# Patient Record
Sex: FEMALE | Race: WHITE | URBAN - METROPOLITAN AREA
[De-identification: names, ages, dates, MRNs, and addresses within clinical notes are randomized per-mention and may not be internally consistent; named-entity substitution may affect disease eponyms.]

---

## 2018-12-17 ENCOUNTER — OFFICE VISIT (OUTPATIENT)
Dept: FAMILY MEDICINE CLINIC | Age: 73
End: 2018-12-17

## 2018-12-17 VITALS
DIASTOLIC BLOOD PRESSURE: 88 MMHG | WEIGHT: 131 LBS | TEMPERATURE: 98.6 F | SYSTOLIC BLOOD PRESSURE: 146 MMHG | BODY MASS INDEX: 25.72 KG/M2 | HEIGHT: 60 IN | HEART RATE: 78 BPM

## 2018-12-17 DIAGNOSIS — G40.409 OTHER GENERALIZED EPILEPSY, NOT INTRACTABLE, WITHOUT STATUS EPILEPTICUS (HCC): Primary | ICD-10-CM

## 2018-12-17 DIAGNOSIS — I10 ESSENTIAL HYPERTENSION: ICD-10-CM

## 2018-12-17 RX ORDER — CARBAMAZEPINE 200 MG/1
200 TABLET ORAL 2 TIMES DAILY
Qty: 180 TAB | Refills: 3 | Status: SHIPPED | OUTPATIENT
Start: 2018-12-17 | End: 2021-03-19 | Stop reason: SDUPTHER

## 2018-12-17 RX ORDER — PHENOBARBITAL 100 MG/1
100 TABLET ORAL
Qty: 30 TAB | Refills: 5 | Status: SHIPPED | OUTPATIENT
Start: 2018-12-17 | End: 2019-06-25 | Stop reason: SDUPTHER

## 2018-12-17 RX ORDER — AMITRIPTYLINE HYDROCHLORIDE 25 MG/1
25 TABLET, FILM COATED ORAL
Qty: 90 TAB | Refills: 3 | Status: SHIPPED | OUTPATIENT
Start: 2018-12-17 | End: 2020-01-13

## 2018-12-17 RX ORDER — ATENOLOL 100 MG/1
100 TABLET ORAL DAILY
Qty: 90 TAB | Refills: 3 | Status: SHIPPED | OUTPATIENT
Start: 2018-12-17 | End: 2021-03-19 | Stop reason: SDUPTHER

## 2018-12-17 NOTE — PROGRESS NOTES
HISTORY OF PRESENT ILLNESS  Letty Pandey is a 74 y.o. female. HPI  Patient states she is a breast cancer survivor. Had 2 big surgeries in [place]. She is taking carbamazepine, amitryptiline. Fenobarbital.  Patient has epilepsy and takes antiseizure medication. Seizure free for 17 years. Needs refills; Atenolol 100 mg  Carbamazepine 200 mg twice a day  Phenobarbital 100 mg once a day at bedtime  Amitryptiline 25 mg once a day at bedtime  Has not been taking for the past 2 weeks. Review of Systems   Constitutional: Negative for chills, fever and weight loss. HENT: Negative for hearing loss. Respiratory: Negative for cough, sputum production, shortness of breath and wheezing. Cardiovascular: Negative for chest pain, palpitations and leg swelling. Gastrointestinal: Negative for abdominal pain, constipation, diarrhea, heartburn, nausea and vomiting. Musculoskeletal: Negative for back pain, myalgias and neck pain. Neurological: Negative for dizziness and headaches. /88 (BP 1 Location: Left arm)   Pulse 78   Temp 98.6 °F (37 °C) (Oral)   Ht 5' 0.04\" (1.525 m)   Wt 131 lb (59.4 kg)   BMI 25.55 kg/m²   Physical Exam   Constitutional: She is oriented to person, place, and time. She appears well-developed. HENT:   Head: Normocephalic. Right Ear: External ear normal.   Left Ear: External ear normal.   Eyes: Conjunctivae and EOM are normal. Pupils are equal, round, and reactive to light. Neck: Normal range of motion. Neck supple. No thyromegaly present. Cardiovascular: Normal rate, regular rhythm and normal heart sounds. No murmur heard. Pulmonary/Chest: Effort normal and breath sounds normal. No respiratory distress. She has no wheezes. She has no rales. Abdominal: Soft. Bowel sounds are normal. She exhibits no distension. Musculoskeletal: Normal range of motion. She exhibits no edema. Neurological: She is alert and oriented to person, place, and time.  She has normal reflexes. Skin: Skin is warm. ASSESSMENT and PLAN  Diagnoses and all orders for this visit:    1. Other generalized epilepsy, not intractable, without status epilepticus ([facility]  -     REFERRAL TO NEUROLOGY  -     carBAMazepine (TEGRETOL) 200 mg tablet; Take 1 Tab by mouth two (2) times a day. -     PHENobarbital (LUMINAL) 100 mg tablet; Take 1 Tab by mouth nightly. Max Daily Amount: 100 mg.  -     amitriptyline (ELAVIL) 25 mg tablet; Take 1 Tab by mouth nightly. 2. Essential hypertension  -     atenolol (TENORMIN) 100 mg tablet; Take 1 Tab by mouth daily. I have refill patient's medications today,  We will have her return in a month for follow up and levels.   We need to refer patient to neurology

## 2018-12-17 NOTE — PROGRESS NOTES
Printed AVS, provided to pt and reviewed. Pt indicated understanding and had no questions. Told pt that rx's have been sent to pharmacy and they should be ready for  in approximately 2 hrs. Pt told to please present Good[employer]. com coupon which we provide to your pharmacy to receive discounted price. Reviewed medications with the pt and Dtr. The appt for Neurology was made for the pt and the Care Card application was given to the pt. Told pt to ask about the care card which is our financial assistance program.  Also told pt that they will be required to do a financial screening  Also told them that if they get a bill before they get their card to call the phone # on the bill to let them know they have applied for the Care Card. Gave pt financial assistance application and highlighted for them the [employer] assistance phone number for them as well.  Nely Dillon RN

## 2019-06-21 ENCOUNTER — TELEPHONE (OUTPATIENT)
Dept: FAMILY MEDICINE CLINIC | Age: 74
End: 2019-06-21

## 2019-06-21 NOTE — TELEPHONE ENCOUNTER
Pt left message regarding her medications. She stated that she had to leave the [place] emergency and could not  refills for her medications while she was away. She is back now and tried to get her medications but was advised that they were . RN called [facility], which advised that the only prescription that has  is Phenobarbital which is good for 6 months. Pt was last seen on 1719 and was to have returned in one month for follow up and levels checked. Routing to Dr. Anali Luna for review.   Wen Amador RN

## 2019-06-24 NOTE — TELEPHONE ENCOUNTER
Pt per provider's message the pt needs an appt. Message routed to front office to call the pt schedule appt.  Polina Shirley RN

## 2019-06-25 DIAGNOSIS — G40.409 OTHER GENERALIZED EPILEPSY, NOT INTRACTABLE, WITHOUT STATUS EPILEPTICUS (HCC): ICD-10-CM

## 2019-06-25 RX ORDER — PHENOBARBITAL 100 MG/1
100 TABLET ORAL
Qty: 30 TAB | Refills: 0 | OUTPATIENT
Start: 2019-06-25 | End: 2021-03-19 | Stop reason: SDUPTHER

## 2019-06-25 NOTE — TELEPHONE ENCOUNTER
Daughter called. Needs Phenobard refilled for mothe/pt. She reports she saw a Neurologist in [facility] but does not have records of visit , cannot get records of visit and he did not give meds refills. Asking for refills as mother is 68 w/ a seizure do. Pt is on 4 meds and all but Phenobarb was order on 12/17/18 for a year except phenobarb was 6 months. Called back to family and scheduled f/u appt in about 1 month on 7/15/19 at 3p at [address] [place] [address]. Daughter does not want to schedule Neurology appt. \" Mother has been on Phenobarb for 40 yrs and she does not have insurance and I am not working\". Discussed financial assistance program to assist w/ specialist bills.     Routing to Dr Miguel Gifford as Dr Crystal Wang is out of office

## 2020-01-11 DIAGNOSIS — G40.409 OTHER GENERALIZED EPILEPSY, NOT INTRACTABLE, WITHOUT STATUS EPILEPTICUS (HCC): ICD-10-CM

## 2020-01-13 RX ORDER — AMITRIPTYLINE HYDROCHLORIDE 25 MG/1
TABLET, FILM COATED ORAL
Qty: 90 TAB | Refills: 0 | Status: SHIPPED | OUTPATIENT
Start: 2020-01-13 | End: 2021-02-24

## 2020-01-13 NOTE — TELEPHONE ENCOUNTER
Patient's daughter Katalina Rivera left a message on 1/11/2020 requesting a refill for Amitrityline for patient. Chart reviewed and refill request has been approved by provider for patient. Nurse telephone patient's daughter Katalina Rivera on PHI, no answer left message that we were returning her phone call. Patient's daughter Katalina Rivera returned phone call. Discussed that  Amitrityline Prescription has been approved by provider and rx sent to patient's pharmacy on file. Per daughter patient has enough medication of the other prescriptions and only needed  Amitrityline at this time. Discussed that per policy for future refills the patient will have to be seen. New process for same day appointment discussed and phone number to call [phone] (between 6-7:30am) was given. This has been fully explained to the patient's daughter , who indicates understanding and agrees with plan. No further questions at this time.     Kian Purcell RN

## 2021-02-24 DIAGNOSIS — G40.409 OTHER GENERALIZED EPILEPSY, NOT INTRACTABLE, WITHOUT STATUS EPILEPTICUS (HCC): ICD-10-CM

## 2021-02-24 RX ORDER — AMITRIPTYLINE HYDROCHLORIDE 25 MG/1
TABLET, FILM COATED ORAL
Qty: 90 TAB | Refills: 0 | Status: SHIPPED | OUTPATIENT
Start: 2021-02-24 | End: 2021-03-19 | Stop reason: SDUPTHER

## 2021-03-19 DIAGNOSIS — I10 ESSENTIAL HYPERTENSION: ICD-10-CM

## 2021-03-19 DIAGNOSIS — G40.409 OTHER GENERALIZED EPILEPSY, NOT INTRACTABLE, WITHOUT STATUS EPILEPTICUS (HCC): ICD-10-CM

## 2021-03-19 NOTE — TELEPHONE ENCOUNTER
Tc from the pt's Dtr she is stating the pt is in need of her refills. They went to the Pharmacy and they said she had no more refills. The pt only will have Amitriptyline 25 mg. She will be completely out of the Trifluoperazine 5 mg, on Monday. This medication was not noted on the pt's current medication list. She will also be running out of Atenolol 100 mg next week. The pt has Phenobarbital 100 mg and carbamazepine 200 mg listed on her current medication list. The pt's last [facility] appt was 02/17/2018. 2 yrs ago. The pt's Dtr was advised to call the same day appt line on Monday at 7am and get an appt for the pt. The pt's Dtr was told the provider would be sent a message for the refill request but the it is really recommended that the pt bee seen by the provider. The  Pt would be  due for lab work as well. The pt's Dtr verbalized understanding. The Dtr stated they would call on Monday for an appt. The Dtr stated that is a problem trying to get to the appt. The pt's dtr was told she could probably have a VV Dr appt if F2F would be a problem for her. This message was sent to the provider.  Sterling Ashraf RN

## 2021-03-22 RX ORDER — ATENOLOL 100 MG/1
100 TABLET ORAL DAILY
Qty: 90 TAB | Refills: 3 | Status: SHIPPED | OUTPATIENT
Start: 2021-03-22 | End: 2021-03-23 | Stop reason: SINTOL

## 2021-03-22 RX ORDER — AMITRIPTYLINE HYDROCHLORIDE 25 MG/1
TABLET, FILM COATED ORAL
Qty: 90 TAB | Refills: 0 | Status: SHIPPED | OUTPATIENT
Start: 2021-03-22 | End: 2021-03-23 | Stop reason: ALTCHOICE

## 2021-03-22 RX ORDER — PHENOBARBITAL 100 MG/1
100 TABLET ORAL
Qty: 30 TAB | Refills: 0 | Status: SHIPPED | OUTPATIENT
Start: 2021-03-22 | End: 2021-03-23

## 2021-03-22 RX ORDER — CARBAMAZEPINE 200 MG/1
200 TABLET ORAL 2 TIMES DAILY
Qty: 180 TAB | Refills: 3 | Status: SHIPPED | OUTPATIENT
Start: 2021-03-22 | End: 2021-03-23

## 2021-03-23 ENCOUNTER — VIRTUAL VISIT (OUTPATIENT)
Dept: FAMILY MEDICINE CLINIC | Age: 76
End: 2021-03-23

## 2021-03-23 DIAGNOSIS — F33.42 RECURRENT MAJOR DEPRESSIVE DISORDER, IN FULL REMISSION (HCC): ICD-10-CM

## 2021-03-23 DIAGNOSIS — G40.309 NONINTRACTABLE GENERALIZED IDIOPATHIC EPILEPSY WITHOUT STATUS EPILEPTICUS (HCC): Primary | ICD-10-CM

## 2021-03-23 DIAGNOSIS — I10 ESSENTIAL HYPERTENSION: ICD-10-CM

## 2021-03-23 PROCEDURE — 99443 PR PHYS/QHP TELEPHONE EVALUATION 21-30 MIN: CPT | Performed by: FAMILY MEDICINE

## 2021-03-23 RX ORDER — PHENOBARBITAL 100 MG/1
100 TABLET ORAL
Qty: 90 TAB | Refills: 0 | Status: SHIPPED | OUTPATIENT
Start: 2021-03-23 | End: 2021-11-11

## 2021-03-23 RX ORDER — ENALAPRIL MALEATE 20 MG/1
20 TABLET ORAL DAILY
Qty: 90 TAB | Refills: 1 | Status: SHIPPED | OUTPATIENT
Start: 2021-03-23 | End: 2021-11-11

## 2021-03-23 RX ORDER — AMITRIPTYLINE HYDROCHLORIDE 25 MG/1
25 TABLET, FILM COATED ORAL
Qty: 30 TAB | Refills: 1 | Status: SHIPPED | OUTPATIENT
Start: 2021-03-23 | End: 2021-11-11

## 2021-03-23 RX ORDER — TRIFLUOPERAZINE HYDROCHLORIDE 5 MG/1
5 TABLET, FILM COATED ORAL DAILY
Qty: 30 TAB | Refills: 1 | Status: SHIPPED | OUTPATIENT
Start: 2021-03-23 | End: 2021-05-17

## 2021-03-23 RX ORDER — CARBAMAZEPINE 200 MG/1
200 TABLET ORAL
Qty: 90 TAB | Refills: 1 | Status: SHIPPED | OUTPATIENT
Start: 2021-03-23 | End: 2022-06-13

## 2021-03-23 NOTE — PROGRESS NOTES
Coordination of Care  1. Have you been to the ER, urgent care clinic since your last visit? Hospitalized since your last visit? No    2. Have you seen or consulted any other health care providers outside of the [address] [place] [address] since your last visit? Include any pap smears or colon screening. No    Does the patient need refills? YES    Learning Assessment Complete? yes  Depression Screening complete in the past 12 months? yes    Per patient no access to vital sign equipment at home.

## 2021-03-23 NOTE — PROGRESS NOTES
Letty Dave (: 1945) is a 66 y.o. female, established patient, here for evaluation of the following chief complaint(s):   Epilepsy (f/u), Hypertension (f/u), and Other (check depression screening)       ASSESSMENT/PLAN:  1. Nonintractable generalized idiopathic epilepsy without status epilepticus ([facility]  Controlled on current regimen. Discussed needs for labs and check up. Patient and daughter agree. -     carBAMazepine (TEGretol) 200 mg tablet; Take 1 Tab by mouth nightly., Normal, Disp-90 Tab, R-1  -     PHENobarbitaL (LUMINAL) 100 mg tablet; Take 1 Tab by mouth nightly. Max Daily Amount: 100 mg., Normal, Disp-90 Tab, R-0I phoned in, has appt with Dr. Rosemarie Greer 7/15  -     CARBAMAZEPINE; Future  -     CBC WITH AUTOMATED DIFF; Future  -     METABOLIC PANEL, COMPREHENSIVE; Future  -     TSH 3RD GENERATION; Future  -     PHENOBARBITAL; Future  2. Essential hypertension  Continue with Enalapril and follow up for F2F visit for BP check  3. Recurrent major depressive disorder, in full remission ([facility]  With increased depressive sx off medication. Discussed the risks with her current regimen including increased TD risk, seizure risk, dementia, falls, neutropenia. I recommended changing regimen to SSRI (Zoloft or Celexa) due to less long term side effects but both daughter and patient were very resistant due to patient being controlled on current regimen for 20 years. They did agree to eliminate one TCA and will use Elavil alone instead of Elavil plus Imipramine. The history of prolonged insomnia in past without the atypical treatment could suggest a Bipolar depression. Return in about 4 weeks (around 2021) for follow up for F2F in 4-6 weeks for check up. .    SUBJECTIVE/OBJECTIVE:  HPI Spoke with patient and daughter who manages patient's care. Since last F2F visit patient was in [place] for a time. [facility].   Seizure Disorder:  Patient is taking Tegretol 200mg QHS, Phenobarbital 100mg QHS regularly without any side effects. Started treatment about 20 years ago. Sees Neurologist regularly in [facility], lasted checked over a year ago. Last seizure was 4 years ago and it was a generalized seizure with LOC. HTN:  Patient is taking Enalapril regularly without any side effects. Stopped Atenolol due to side effects. Does not have BP home monitor. Depression/Insomnia:  Patient with a long history of severe depression with insomnia. Currently taking Trifluoperazine 5mg QAM (for 20 years per patient), Elavil 25 mg, and Imipramine 25mg QHS. She has been cutting back on her medication because she is running out and is having depressed mood, crying spells, insomnia. Prior trials in [facility] were Diazepam and others that she does not remember. She states in past she was not able to sleep for days if she did not take her current combination. Prior appointments she had not mentioned Trifluoperazine because she was getting the medication from [facility]. Review of Systems   Constitutional: Negative for diaphoresis, fatigue and fever. Respiratory: Negative for cough, chest tightness and shortness of breath. Cardiovascular: Negative for chest pain and palpitations. Neurological: Negative for tremors, seizures, syncope and light-headedness. Patient-Reported Vitals 3/23/2021   Patient-Reported LMP menapause       Physical Exam    On this date 03/23/2021 I have spent 30 minutes reviewing previous notes, test results and face to face (virtual) with the patient discussing the diagnosis and importance of compliance with the treatment plan as well as documenting on the day of the visit. Maria Isabel Echeverria is being evaluated by a Virtual Visit (phone visit) encounter to address concerns as mentioned above. A caregiver was present when appropriate.  Due to this being a TeleHealth encounter (During FGABJ-78 public health emergency), evaluation of the following organ systems was limited: Vitals/Constitutional/EENT/Resp/CV/GI//MS/Neuro/Skin/Heme-Lymph-Imm. Pursuant to the emergency declaration under the [address] [place] [address] and the [employer] Act, this Virtual Visit was conducted with patient's (and/or legal guardian's) consent, to reduce the patient's risk of exposure to COVID-19 and provide necessary medical care. The patient (and/or legal guardian) has also been advised to contact this office for worsening conditions or problems, and seek emergency medical treatment and/or call 911 if deemed necessary. Patient identification was verified at the start of the visit: YES    Services were provided through a phone synchronous discussion virtually to substitute for in-person clinic visit. Patient was located at home and provider was located in office or at home. An electronic signature was used to authenticate this note.   -- Carmen Peacock MD

## 2021-03-23 NOTE — PROGRESS NOTES
Avs discussed with Letty Camejo by Discharge Nurse Vianney Kaye LPN. Discussed medication prescribed today, pt/ daughter states no coupon needed. Pt is aware that she will be put on the COVID vaccine list for an appt. Patient verbalized understanding and has no further questions.  AVS printed and given to patient Vianney Kaye LPN

## 2021-04-16 ENCOUNTER — IMMUNIZATION (OUTPATIENT)
Dept: FAMILY MEDICINE CLINIC | Age: 76
End: 2021-04-16

## 2021-04-16 DIAGNOSIS — Z23 ENCOUNTER FOR IMMUNIZATION: Primary | ICD-10-CM

## 2021-04-16 PROCEDURE — 0011A COVID-19, MRNA, LNP-S, PF, 100MCG/0.5ML DOSE(MODERNA): CPT

## 2021-04-16 PROCEDURE — 91301 COVID-19, MRNA, LNP-S, PF, 100MCG/0.5ML DOSE(MODERNA): CPT

## 2021-04-20 ENCOUNTER — HOSPITAL ENCOUNTER (OUTPATIENT)
Dept: LAB | Age: 76
Discharge: HOME | End: 2021-04-20

## 2021-04-20 ENCOUNTER — LAB ONLY (OUTPATIENT)
Dept: FAMILY MEDICINE CLINIC | Age: 76
End: 2021-04-20

## 2021-04-20 DIAGNOSIS — G40.309 NONINTRACTABLE GENERALIZED IDIOPATHIC EPILEPSY WITHOUT STATUS EPILEPTICUS (HCC): ICD-10-CM

## 2021-04-20 PROCEDURE — 80184 ASSAY OF PHENOBARBITAL: CPT

## 2021-04-20 PROCEDURE — 84443 ASSAY THYROID STIM HORMONE: CPT

## 2021-04-20 PROCEDURE — 85025 COMPLETE CBC W/AUTO DIFF WBC: CPT

## 2021-04-20 PROCEDURE — 80053 COMPREHEN METABOLIC PANEL: CPT

## 2021-04-21 LAB
ALBUMIN SERPL-MCNC: 4.1 G/DL (ref 3.5–5)
ALBUMIN/GLOB SERPL: 1.1 {RATIO} (ref 1.1–2.2)
ALP SERPL-CCNC: 187 U/L (ref 45–117)
ALT SERPL-CCNC: 30 U/L (ref 12–78)
ANION GAP SERPL CALC-SCNC: 8 MMOL/L (ref 5–15)
AST SERPL-CCNC: 23 U/L (ref 15–37)
BASOPHILS # BLD: 0 K/UL (ref 0–0.1)
BASOPHILS NFR BLD: 1 % (ref 0–1)
BILIRUB SERPL-MCNC: 0.2 MG/DL (ref 0.2–1)
BUN SERPL-MCNC: 18 MG/DL (ref 6–20)
BUN/CREAT SERPL: 21 (ref 12–20)
CALCIUM SERPL-MCNC: 9.2 MG/DL (ref 8.5–10.1)
CHLORIDE SERPL-SCNC: 101 MMOL/L (ref 97–108)
CO2 SERPL-SCNC: 28 MMOL/L (ref 21–32)
COMMENT, HOLDF: NORMAL
CREAT SERPL-MCNC: 0.84 MG/DL (ref 0.55–1.02)
DIFFERENTIAL METHOD BLD: NORMAL
EOSINOPHIL # BLD: 0.1 K/UL (ref 0–0.4)
EOSINOPHIL NFR BLD: 1 % (ref 0–7)
ERYTHROCYTE [DISTWIDTH] IN BLOOD BY AUTOMATED COUNT: 12.3 % (ref 11.5–14.5)
GLOBULIN SER CALC-MCNC: 3.7 G/DL (ref 2–4)
GLUCOSE SERPL-MCNC: 96 MG/DL (ref 65–100)
HCT VFR BLD AUTO: 45.1 % (ref 35–47)
HGB BLD-MCNC: 14.8 G/DL (ref 11.5–16)
IMM GRANULOCYTES # BLD AUTO: 0 K/UL (ref 0–0.04)
IMM GRANULOCYTES NFR BLD AUTO: 0 % (ref 0–0.5)
LYMPHOCYTES # BLD: 1.8 K/UL (ref 0.8–3.5)
LYMPHOCYTES NFR BLD: 37 % (ref 12–49)
MCH RBC QN AUTO: 31.3 PG (ref 26–34)
MCHC RBC AUTO-ENTMCNC: 32.8 G/DL (ref 30–36.5)
MCV RBC AUTO: 95.3 FL (ref 80–99)
MONOCYTES # BLD: 0.4 K/UL (ref 0–1)
MONOCYTES NFR BLD: 8 % (ref 5–13)
NEUTS SEG # BLD: 2.6 K/UL (ref 1.8–8)
NEUTS SEG NFR BLD: 53 % (ref 32–75)
NRBC # BLD: 0 K/UL (ref 0–0.01)
NRBC BLD-RTO: 0 PER 100 WBC
PHENOBARB SERPL-MCNC: 26 UG/ML (ref 15–40)
PLATELET # BLD AUTO: 333 K/UL (ref 150–400)
PMV BLD AUTO: 10.9 FL (ref 8.9–12.9)
POTASSIUM SERPL-SCNC: 4.4 MMOL/L (ref 3.5–5.1)
PROT SERPL-MCNC: 7.8 G/DL (ref 6.4–8.2)
RBC # BLD AUTO: 4.73 M/UL (ref 3.8–5.2)
SAMPLES BEING HELD,HOLD: NORMAL
SODIUM SERPL-SCNC: 137 MMOL/L (ref 136–145)
TSH SERPL DL<=0.05 MIU/L-ACNC: 1.18 UIU/ML (ref 0.36–3.74)
WBC # BLD AUTO: 4.9 K/UL (ref 3.6–11)

## 2021-04-22 NOTE — PROGRESS NOTES
Alk Phos is a little high. This can be found in liver or bone. It can be rechecked in 6 months. Other labs look good.

## 2021-04-29 ENCOUNTER — OFFICE VISIT (OUTPATIENT)
Dept: FAMILY MEDICINE CLINIC | Age: 76
End: 2021-04-29

## 2021-04-29 VITALS
DIASTOLIC BLOOD PRESSURE: 95 MMHG | WEIGHT: 132.2 LBS | BODY MASS INDEX: 26.65 KG/M2 | HEIGHT: 59 IN | TEMPERATURE: 98 F | HEART RATE: 106 BPM | SYSTOLIC BLOOD PRESSURE: 147 MMHG

## 2021-04-29 DIAGNOSIS — G40.309 NONINTRACTABLE GENERALIZED IDIOPATHIC EPILEPSY WITHOUT STATUS EPILEPTICUS (HCC): ICD-10-CM

## 2021-04-29 DIAGNOSIS — F41.1 GENERALIZED ANXIETY DISORDER: ICD-10-CM

## 2021-04-29 DIAGNOSIS — I10 ESSENTIAL HYPERTENSION: Primary | ICD-10-CM

## 2021-04-29 PROCEDURE — 99214 OFFICE O/P EST MOD 30 MIN: CPT | Performed by: FAMILY MEDICINE

## 2021-04-29 NOTE — PROGRESS NOTES
Raquel Adalberto Goodpasture (: 1945) is a 66 y.o. female, established patient, here for evaluation of the following chief complaint(s):  Follow-up       ASSESSMENT/PLAN:  1. Essential hypertension  Mildly elevated today in an anxious person. Will have daughter check BP at home and instructions given. 2. Generalized anxiety disorder  Controlled with current regimen without any SE noted. Continue with current care  3. Nonintractable generalized idiopathic epilepsy without status epilepticus ([facility]  Controlled, continue with current medications. Follow-up and Dispositions    · Return in about 6 months (around 10/29/2021) for follow up for F2F in 6 months for check up and labs. SUBJECTIVE:  HPI  Patient seen with daughter Wei Montes who manages patient's care. HTN:  Patient is taking Enalapril regularly without any side effects. Stopped Atenolol because it caused palpitations. Does not have BP home monitor. Seizure Disorder:  Started after a fall and brain bleed at 39 yo. Patient is taking Tegretol 200mg QHS, Phenobarbital 100mg QHS regularly without any side effects. Sees Neurologist regularly in [facility], lasted checked over a year ago. On treatment for 20 years. No recent seizures. Anxiety/Insomnia:  Patient with a long history of severe anxiety with insomnia. This started after fall and brain bleed at age 42yo. Currently taking Trifluoperazine 5mg QAM and Elavil 25 mg without any tremors, anxiety, memory difficulty, falls. Cutting back on her medication recently caused worsening depressed mood, crying spells, insomnia. Patient states she does not have a history of depression but gets depressed at times because she has not been allowed to return to [facility] due to [employer] restrictions. Her depressive sx may last 2-3 days and then resolve. Prior trials in [facility] for severe anxiety were Diazepam and others that she does not remember. Was followed by psychiatry.   Has taken current combination for 20 years.  Prior appointments she had not mentioned Trifluoperazine because she was getting the medication from [facility]. PHx:  Fall with brain bleed at age 44yo. Seizures and anxiety started after that injury. Ovarian Cancer:  Surgically removed 2017. Followed regularly by Oncology in [facility]. Review of Systems   Constitutional: Negative for diaphoresis, fatigue and fever. Respiratory: Negative for cough, chest tightness and shortness of breath. Cardiovascular: Negative for chest pain and palpitations. Neurological: Negative for tremors, seizures, syncope and light-headedness. OBJECTIVE:  Blood pressure (!) 147/95, pulse (!) 106, temperature 98 °F (36.7 °C), height 4' 11.06\" (1.5 m), weight 132 lb 3.2 oz (60 kg). Physical Exam  CONSTITUTIONAL:  Well developed. Age appropriate. No apparent distress. PSYCHIATRIC: Oriented to time, place, person & situation. Appropriate mood and affect. Speech fluent. Thought process normal.  NECK:  Normal inspection, normal palpation without any lymphadenopathy, masses, or thyromegaly  CARDIOVASCULAR:  Regular rate and rhythm. Normal S1, S2. No extra sounds. RESPIRATORY:  Normal effort. Normal ascultation without wheezing. ABDOMEN:  Normal bowel sounds. Abdomen soft, non tender. No hepatosplenomegaly or masses. VASCULAR:  Normal Carotid pulses without bruits. Normal Posterior Tibialis pulses. No abdominal or femoral bruits. EXTREMITIES:  No edema. NEUROLOGICAL:  Gait normal.  No tremor noted. CN II-XI grossly intact. No results found for this visit on 04/29/21. An electronic signature was used to authenticate this note.   -- Elsa Hurley MD

## 2021-04-29 NOTE — PROGRESS NOTES
Coordination of Care  1. Have you been to the ER, urgent care clinic since your last visit? Hospitalized since your last visit? No    2. Have you seen or consulted any other health care providers outside of the [address] [place] [address] since your last visit? Include any pap smears or colon screening. No    Does the patient need refills? NO    Learning Assessment Complete?  yes  Depression Screening complete in the past 12 months? yes

## 2021-04-29 NOTE — PATIENT INSTRUCTIONS
Use maquina para medir la presion: [employer]. Presion normal 120/80. [place] es mas de 140/90. Pulse normal es menos de 100.

## 2021-05-11 ENCOUNTER — TELEPHONE (OUTPATIENT)
Dept: FAMILY MEDICINE CLINIC | Age: 76
End: 2021-05-11

## 2021-05-14 ENCOUNTER — IMMUNIZATION (OUTPATIENT)
Dept: FAMILY MEDICINE CLINIC | Age: 76
End: 2021-05-14

## 2021-05-14 DIAGNOSIS — Z23 ENCOUNTER FOR IMMUNIZATION: Primary | ICD-10-CM

## 2021-05-14 PROCEDURE — 0012A COVID-19, MRNA, LNP-S, PF, 100MCG/0.5ML DOSE(MODERNA): CPT

## 2021-05-14 PROCEDURE — 91301 COVID-19, MRNA, LNP-S, PF, 100MCG/0.5ML DOSE(MODERNA): CPT

## 2021-07-13 ENCOUNTER — TELEPHONE (OUTPATIENT)
Dept: FAMILY MEDICINE CLINIC | Age: 76
End: 2021-07-13

## 2021-07-13 NOTE — TELEPHONE ENCOUNTER
Tc from the pt's dtr Celine, the pt's dtr. She had left a message on the [facility] phone that the pt is having pain in her back, and she stated the pt has taken Ibuprofen 800 mg before and would like to know if the Dr would order this for her again for her. Routed the message to the provider. Tc to the pt's dtr. No answer. A voice mail was left on her identifiable voice mail that the nurse had received her message, and had routed it to the provider. The provider however may request her to have an appt for evaluation if this is a new problem, also there is no record of the [facility] provider prescribing the Ibuprofen 800 mg so she may need to have an appt for this rx to be approved.  The same day appt line phone # and protocol was left on the  message as well  Vi Bland RN

## 2021-07-14 NOTE — TELEPHONE ENCOUNTER
Tc to the dtr's phone. No answer. The providers message was left for the pt's dtr on her phone that the provider would prefer the pt have a F2F appt to be evaluated. The same day appt line number was left as well as the [facility] phone #.  Hillary Willoughby RN

## 2021-11-10 DIAGNOSIS — G40.309 NONINTRACTABLE GENERALIZED IDIOPATHIC EPILEPSY WITHOUT STATUS EPILEPTICUS (HCC): ICD-10-CM

## 2021-11-11 ENCOUNTER — TELEPHONE (OUTPATIENT)
Dept: FAMILY MEDICINE CLINIC | Age: 76
End: 2021-11-11

## 2021-11-11 RX ORDER — ENALAPRIL MALEATE 20 MG/1
TABLET ORAL
Qty: 90 TABLET | Refills: 0 | Status: SHIPPED | OUTPATIENT
Start: 2021-11-11 | End: 2021-11-15

## 2021-11-11 RX ORDER — PHENOBARBITAL 100 MG/1
TABLET ORAL
Qty: 90 TABLET | Refills: 0 | Status: SHIPPED | OUTPATIENT
Start: 2021-11-11 | End: 2022-07-14 | Stop reason: SDUPTHER

## 2021-11-11 RX ORDER — AMITRIPTYLINE HYDROCHLORIDE 25 MG/1
TABLET, FILM COATED ORAL
Qty: 90 TABLET | Refills: 0 | Status: SHIPPED | OUTPATIENT
Start: 2021-11-11 | End: 2022-06-13

## 2021-11-11 NOTE — TELEPHONE ENCOUNTER
Tc to the pt 2x to let her know her refills were sent to her Pharmacy today. No one answered. A message was left for her to contact the [facility].  Zahira Lovell RN

## 2021-12-20 RX ORDER — TRIFLUOPERAZINE HYDROCHLORIDE 5 MG/1
TABLET, FILM COATED ORAL
Qty: 30 TABLET | Refills: 0 | Status: SHIPPED | OUTPATIENT
Start: 2021-12-20 | End: 2022-01-25

## 2022-01-25 RX ORDER — TRIFLUOPERAZINE HYDROCHLORIDE 5 MG/1
TABLET, FILM COATED ORAL
Qty: 30 TABLET | Refills: 0 | Status: SHIPPED | OUTPATIENT
Start: 2022-01-25 | End: 2022-07-14 | Stop reason: DRUGHIGH

## 2022-03-18 PROBLEM — F33.42 RECURRENT MAJOR DEPRESSIVE DISORDER, IN FULL REMISSION (HCC): Status: ACTIVE | Noted: 2021-03-23

## 2022-03-19 PROBLEM — I10 ESSENTIAL HYPERTENSION: Status: ACTIVE | Noted: 2021-03-23

## 2022-03-20 PROBLEM — G40.309 NONINTRACTABLE GENERALIZED IDIOPATHIC EPILEPSY WITHOUT STATUS EPILEPTICUS (HCC): Status: ACTIVE | Noted: 2021-03-23

## 2022-06-13 DIAGNOSIS — G40.309 NONINTRACTABLE GENERALIZED IDIOPATHIC EPILEPSY WITHOUT STATUS EPILEPTICUS (HCC): ICD-10-CM

## 2022-06-13 DIAGNOSIS — I10 ESSENTIAL HYPERTENSION: ICD-10-CM

## 2022-06-13 RX ORDER — CARBAMAZEPINE 200 MG/1
TABLET ORAL
Qty: 180 TABLET | Refills: 0 | Status: SHIPPED | OUTPATIENT
Start: 2022-06-13 | End: 2022-07-14 | Stop reason: DRUGHIGH

## 2022-06-13 RX ORDER — ATENOLOL 100 MG/1
TABLET ORAL
Qty: 90 TABLET | Refills: 0 | Status: SHIPPED | OUTPATIENT
Start: 2022-06-13 | End: 2022-07-14 | Stop reason: ALTCHOICE

## 2022-06-13 RX ORDER — AMITRIPTYLINE HYDROCHLORIDE 25 MG/1
TABLET, FILM COATED ORAL
Qty: 90 TABLET | Refills: 0 | Status: SHIPPED | OUTPATIENT
Start: 2022-06-13 | End: 2022-07-14 | Stop reason: SDUPTHER

## 2022-07-14 ENCOUNTER — HOSPITAL ENCOUNTER (OUTPATIENT)
Dept: LAB | Age: 77
Discharge: HOME | End: 2022-07-14

## 2022-07-14 ENCOUNTER — OFFICE VISIT (OUTPATIENT)
Dept: FAMILY MEDICINE CLINIC | Age: 77
End: 2022-07-14

## 2022-07-14 VITALS
HEIGHT: 59 IN | BODY MASS INDEX: 25.8 KG/M2 | SYSTOLIC BLOOD PRESSURE: 136 MMHG | OXYGEN SATURATION: 97 % | TEMPERATURE: 97.5 F | HEART RATE: 88 BPM | DIASTOLIC BLOOD PRESSURE: 73 MMHG | WEIGHT: 128 LBS

## 2022-07-14 DIAGNOSIS — G40.309 NONINTRACTABLE GENERALIZED IDIOPATHIC EPILEPSY WITHOUT STATUS EPILEPTICUS (HCC): ICD-10-CM

## 2022-07-14 DIAGNOSIS — I10 ESSENTIAL HYPERTENSION: ICD-10-CM

## 2022-07-14 DIAGNOSIS — F41.1 GENERALIZED ANXIETY DISORDER: Primary | ICD-10-CM

## 2022-07-14 PROCEDURE — 80184 ASSAY OF PHENOBARBITAL: CPT

## 2022-07-14 PROCEDURE — 80053 COMPREHEN METABOLIC PANEL: CPT

## 2022-07-14 PROCEDURE — 82607 VITAMIN B-12: CPT

## 2022-07-14 PROCEDURE — 85025 COMPLETE CBC W/AUTO DIFF WBC: CPT

## 2022-07-14 PROCEDURE — 1123F ACP DISCUSS/DSCN MKR DOCD: CPT | Performed by: FAMILY MEDICINE

## 2022-07-14 PROCEDURE — 99214 OFFICE O/P EST MOD 30 MIN: CPT | Performed by: FAMILY MEDICINE

## 2022-07-14 PROCEDURE — 84443 ASSAY THYROID STIM HORMONE: CPT

## 2022-07-14 PROCEDURE — 80156 ASSAY CARBAMAZEPINE TOTAL: CPT

## 2022-07-14 RX ORDER — CARBAMAZEPINE 200 MG/1
200 TABLET ORAL 2 TIMES DAILY
Qty: 180 TABLET | Refills: 3 | Status: SHIPPED | OUTPATIENT
Start: 2022-07-14

## 2022-07-14 RX ORDER — IMIPRAMINE HYDROCHLORIDE 25 MG/1
25 TABLET ORAL DAILY
Qty: 90 TABLET | Refills: 1 | Status: SHIPPED | OUTPATIENT
Start: 2022-07-14

## 2022-07-14 RX ORDER — AMITRIPTYLINE HYDROCHLORIDE 25 MG/1
25 TABLET, FILM COATED ORAL
Qty: 90 TABLET | Refills: 3 | Status: SHIPPED | OUTPATIENT
Start: 2022-07-14

## 2022-07-14 RX ORDER — IMIPRAMINE HYDROCHLORIDE 25 MG/1
25 TABLET ORAL
Qty: 90 TABLET | Refills: 1 | Status: SHIPPED | OUTPATIENT
Start: 2022-07-14 | End: 2022-07-14 | Stop reason: SDUPTHER

## 2022-07-14 RX ORDER — ENALAPRIL MALEATE 20 MG/1
20 TABLET ORAL DAILY
Qty: 90 TABLET | Refills: 3 | Status: SHIPPED | OUTPATIENT
Start: 2022-07-14

## 2022-07-14 RX ORDER — TRIFLUOPERAZINE HYDROCHLORIDE 1 MG/1
1 TABLET, FILM COATED ORAL 3 TIMES DAILY
Qty: 270 TABLET | Refills: 1 | Status: SHIPPED | OUTPATIENT
Start: 2022-07-14

## 2022-07-14 RX ORDER — PHENOBARBITAL 100 MG/1
TABLET ORAL
Qty: 90 TABLET | Refills: 1 | Status: SHIPPED | OUTPATIENT
Start: 2022-07-14

## 2022-07-14 NOTE — PROGRESS NOTES
Letty Zheng (: 1945) is a 76 y.o. female, established patient, here for evaluation of the following chief complaint(s):  Epilepsy (and HTN f/up) and Medication Refill (all meds + carbamazepine)       ASSESSMENT/PLAN:  1. Generalized anxiety disorder  Followed by psychiatry and recent changes reviewed. She has difficulty getting the medications in [facility] and so prescriptions written. Will decrease Stelazine as written and reviewed long term SE of this medication. Also reviewed sedation, confusion, memory problems, and even hallucinations with Elavil/Tofranil combination. Patient denies any of these side effects and would like to continue with dosing since it was written by her psychiatrist.  -     VITAMIN B12; Future  2. Nonintractable generalized idiopathic epilepsy without status epilepticus ([facility]  Controlled, continue current regimen. -     carBAMazepine (TEGretol) 200 mg tablet; Take 1 Tablet by mouth two (2) times a day., Normal, Disp-180 Tablet, R-3  -     PHENobarbitaL (LUMINAL) 100 mg tablet; TAKE 1 TABLET BY MOUTH NIGHTLY . DO NOT EXCEED 1 PER 24 HOURS, Normal, Disp-90 Tablet, R-1  -     CBC WITH AUTOMATED DIFF; Future  -     METABOLIC PANEL, COMPREHENSIVE; Future  -     TSH 3RD GENERATION; Future  -     PHENOBARBITAL LEVEL; Future  -     CARBAMAZEPINE; Future  3. Essential hypertension  Controlled, continue with Enalapril. Follow-up and Dispositions    · Return in about 6 months (around 2023) for follow up for F2F in 6 months for anxiety, seizure disorder. SUBJECTIVE:  HPI  Patient seen with daughter, Dariela Kaiist who manages patient's care. Patient has been in [facility] for the past year caring for her father who is 79 yo. Reviewed available records from pt's psychiatrist in [facility], dated 2022. HTN:  Patient is taking Enalapril regularly without any side effects. Stopped Atenolol because it caused palpitations. Does not have BP home monitor.   Anxiety/Insomnia:  Patient with a long history of severe anxiety with insomnia. This started after fall and brain bleed at age 42yo. Psychiatrist in [facility] increased her Tegretol to BID, decreased her Trifluoperazine to 1 mg TID, Added Imipramine 25mg QAM and continued Elavil 25 mg QHS. Pt was having more anxiety and depression in caring for her elderly father. Patient denies tremors, memory difficulty, falls, hallucinations. Cutting back on her medication has caused worsening depressed mood, crying spells, insomnia. Prior trials in [facility] for severe anxiety were Diazepam and others that she does not remember. Was followed by psychiatry. Has taken current combination for 20 years. Prior appointments she had not mentioned Trifluoperazine because she was getting the medication from [facility]. Seizure Disorder:   Started after a fall and brain bleed at 37 yo. Patient is taking Tegretol 200mg BID, Phenobarbital 100mg QHS regularly without any side effects. Sees Neurologist regularly in [facility], lasted checked over a year ago. On treatment for 20 years. No recent seizures. PMHx:  Fall with brain bleed at age 42yo. Seizures and anxiety started after that injury. Ovarian Cancer:  Surgically removed 2017. Followed regularly by Oncology in [facility]. Review of Systems   Constitutional: Negative for diaphoresis, fatigue and fever. Eyes: Negative for visual disturbance. Respiratory: Negative for cough, chest tightness and shortness of breath. Cardiovascular: Negative for chest pain, palpitations and leg swelling. Neurological: Negative for tremors, syncope, weakness and light-headedness. Psychiatric/Behavioral: Negative for confusion, dysphoric mood, hallucinations and suicidal ideas. OBJECTIVE:  Blood pressure 136/73, pulse 88, temperature 97.5 °F (36.4 °C), temperature source Temporal, height 4' 11.25\" (1.505 m), weight 128 lb (58.1 kg), SpO2 97 %. Physical Exam  CONSTITUTIONAL:  Well developed. Age appropriate.   No apparent distress. PSYCHIATRIC: Oriented to time, place, person & situation. Appropriate mood and affect. Speech fluent. Thought process normal.  NECK:  Normal inspection, normal palpation without any lymphadenopathy, masses, or thyromegaly  CARDIOVASCULAR:  Regular rate and rhythm. Normal S1, S2. No extra sounds. RESPIRATORY:  Normal effort. Normal ascultation without wheezing. VASCULAR:  Normal Carotid pulses without bruits. EXTREMITIES:  No edema. NEUROLOGICAL:  Gait normal.  No tremor noted. CN II-XI grossly intact. No results found for this visit on 07/14/22. An electronic signature was used to authenticate this note.   -- Conrado Vásquez MD

## 2022-07-14 NOTE — PROGRESS NOTES
I have printed AVS and reviewed it with patient today. Patient verbalized understanding. I reviewed with patient medications sent to pharmacy and how the medication is taken. Patient verbalized understanding. The patient was given coupons for prescriptions and I explained how the coupons are used. Patient verbalized understanding. I instructed patient that he or she will receive a phone call to schedule a follow-up appointment. Patient verbalized understanding. Patient correctly stated her full name and date of birth prior to the information shared.  Jolly with the [place] [id number] assisted with this discharge.  Eder Donovan RN

## 2022-07-14 NOTE — PROGRESS NOTES
Coordination of Care  1. Have you been to the ER, urgent care clinic since your last visit? Hospitalized since your last visit? No    2. Have you seen or consulted any other health care providers outside of the [address] [place] [address] since your last visit? Include any pap smears or colon screening. No    Does the patient need refills? YES    Learning Assessment Complete?  yes  Depression Screening complete in the past 12 months? yes

## 2022-07-15 LAB
ALBUMIN SERPL-MCNC: 3.8 G/DL (ref 3.5–5)
ALBUMIN/GLOB SERPL: 1.1 {RATIO} (ref 1.1–2.2)
ALP SERPL-CCNC: 145 U/L (ref 45–117)
ALT SERPL-CCNC: 19 U/L (ref 12–78)
ANION GAP SERPL CALC-SCNC: 7 MMOL/L (ref 5–15)
AST SERPL-CCNC: 19 U/L (ref 15–37)
BASOPHILS # BLD: 0 K/UL (ref 0–0.1)
BASOPHILS NFR BLD: 1 % (ref 0–1)
BILIRUB SERPL-MCNC: 0.2 MG/DL (ref 0.2–1)
BUN SERPL-MCNC: 29 MG/DL (ref 6–20)
BUN/CREAT SERPL: 32 (ref 12–20)
CALCIUM SERPL-MCNC: 8.8 MG/DL (ref 8.5–10.1)
CARBAMAZEPINE SERPL-MCNC: 3.6 UG/ML (ref 4–12)
CHLORIDE SERPL-SCNC: 100 MMOL/L (ref 97–108)
CO2 SERPL-SCNC: 29 MMOL/L (ref 21–32)
CREAT SERPL-MCNC: 0.91 MG/DL (ref 0.55–1.02)
DIFFERENTIAL METHOD BLD: NORMAL
EOSINOPHIL # BLD: 0.1 K/UL (ref 0–0.4)
EOSINOPHIL NFR BLD: 2 % (ref 0–7)
ERYTHROCYTE [DISTWIDTH] IN BLOOD BY AUTOMATED COUNT: 12.5 % (ref 11.5–14.5)
GLOBULIN SER CALC-MCNC: 3.4 G/DL (ref 2–4)
GLUCOSE SERPL-MCNC: 87 MG/DL (ref 65–100)
HCT VFR BLD AUTO: 41.5 % (ref 35–47)
HGB BLD-MCNC: 13.9 G/DL (ref 11.5–16)
IMM GRANULOCYTES # BLD AUTO: 0 K/UL (ref 0–0.04)
IMM GRANULOCYTES NFR BLD AUTO: 0 % (ref 0–0.5)
LYMPHOCYTES # BLD: 2 K/UL (ref 0.8–3.5)
LYMPHOCYTES NFR BLD: 39 % (ref 12–49)
MCH RBC QN AUTO: 31.9 PG (ref 26–34)
MCHC RBC AUTO-ENTMCNC: 33.5 G/DL (ref 30–36.5)
MCV RBC AUTO: 95.2 FL (ref 80–99)
MONOCYTES # BLD: 0.6 K/UL (ref 0–1)
MONOCYTES NFR BLD: 11 % (ref 5–13)
NEUTS SEG # BLD: 2.5 K/UL (ref 1.8–8)
NEUTS SEG NFR BLD: 47 % (ref 32–75)
NRBC # BLD: 0 K/UL (ref 0–0.01)
NRBC BLD-RTO: 0 PER 100 WBC
PHENOBARB SERPL-MCNC: 20.7 UG/ML (ref 15–40)
PLATELET # BLD AUTO: 330 K/UL (ref 150–400)
PMV BLD AUTO: 10 FL (ref 8.9–12.9)
POTASSIUM SERPL-SCNC: 4.4 MMOL/L (ref 3.5–5.1)
PROT SERPL-MCNC: 7.2 G/DL (ref 6.4–8.2)
RBC # BLD AUTO: 4.36 M/UL (ref 3.8–5.2)
SODIUM SERPL-SCNC: 136 MMOL/L (ref 136–145)
TSH SERPL DL<=0.05 MIU/L-ACNC: 1.1 UIU/ML (ref 0.36–3.74)
VIT B12 SERPL-MCNC: 1905 PG/ML (ref 193–986)
WBC # BLD AUTO: 5.3 K/UL (ref 3.6–11)

## 2022-07-18 NOTE — PROGRESS NOTES
Spoke with patient's daughter who helps manage her care. All labs are stable, continue with current regimen.

## 2023-05-23 RX ORDER — TRIFLUOPERAZINE HYDROCHLORIDE 1 MG/1
1 TABLET, FILM COATED ORAL 3 TIMES DAILY
COMMUNITY
Start: 2022-07-14

## 2023-05-23 RX ORDER — PHENOBARBITAL 100 MG/1
TABLET ORAL
COMMUNITY
Start: 2023-01-23 | End: 2023-06-26

## 2023-05-23 RX ORDER — AMITRIPTYLINE HYDROCHLORIDE 25 MG/1
1 TABLET, FILM COATED ORAL NIGHTLY
COMMUNITY
Start: 2022-07-14

## 2023-05-23 RX ORDER — ENALAPRIL MALEATE 20 MG/1
20 TABLET ORAL DAILY
COMMUNITY
Start: 2022-07-14

## 2023-05-23 RX ORDER — IMIPRAMINE HCL 25 MG
25 TABLET ORAL DAILY
COMMUNITY
Start: 2022-07-14

## 2023-05-23 RX ORDER — CARBAMAZEPINE 200 MG/1
200 TABLET ORAL 2 TIMES DAILY
COMMUNITY
Start: 2022-07-14

## 2023-06-25 DIAGNOSIS — G40.309 GENERALIZED IDIOPATHIC EPILEPSY AND EPILEPTIC SYNDROMES, NOT INTRACTABLE, WITHOUT STATUS EPILEPTICUS (HCC): Primary | ICD-10-CM

## 2023-06-26 RX ORDER — PHENOBARBITAL 100 MG/1
TABLET ORAL
Qty: 30 TABLET | Refills: 0 | Status: SHIPPED | OUTPATIENT
Start: 2023-06-26 | End: 2023-07-26